# Patient Record
Sex: FEMALE | Race: BLACK OR AFRICAN AMERICAN | NOT HISPANIC OR LATINO | URBAN - METROPOLITAN AREA
[De-identification: names, ages, dates, MRNs, and addresses within clinical notes are randomized per-mention and may not be internally consistent; named-entity substitution may affect disease eponyms.]

---

## 2019-06-29 ENCOUNTER — EMERGENCY (EMERGENCY)
Age: 49
LOS: 1 days | Discharge: HOME | End: 2019-06-29
Admitting: EMERGENCY MEDICINE
Payer: COMMERCIAL

## 2019-06-29 VITALS
RESPIRATION RATE: 18 BRPM | OXYGEN SATURATION: 100 % | HEART RATE: 76 BPM | TEMPERATURE: 98 F | DIASTOLIC BLOOD PRESSURE: 66 MMHG | SYSTOLIC BLOOD PRESSURE: 113 MMHG

## 2019-06-29 VITALS
WEIGHT: 167 LBS | HEART RATE: 78 BPM | SYSTOLIC BLOOD PRESSURE: 124 MMHG | OXYGEN SATURATION: 99 % | DIASTOLIC BLOOD PRESSURE: 72 MMHG | TEMPERATURE: 99 F | RESPIRATION RATE: 16 BRPM

## 2019-06-29 PROCEDURE — 99282 EMERGENCY DEPT VISIT SF MDM: CPT
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
## 2019-06-29 NOTE — ED STATDOCS - OBJECTIVE STATEMENT
I performed a medical screening examination and determined this patient to be medically stable and will transfer to the [facility] adult ED for further care. heart and lung exam done and both did not reveal concerns for immediate intervention.    discussed case with Dr. Bradford.
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
## 2019-06-30 RX ORDER — IBUPROFEN 200 MG
600 TABLET ORAL ONCE
Refills: 0 | Status: COMPLETED | OUTPATIENT
Start: 2019-06-30 | End: 2019-06-30

## 2019-06-30 RX ADMIN — Medication 600 MILLIGRAM(S): at 00:10
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
## 2019-06-30 NOTE — ED PROVIDER NOTE - CHPI ED SYMPTOMS NEG
Denies LOC, head injury, n/v/d, extremities numbness, weakness, tingling, or  SOB Denies LOC, head injury, n/v/d, extremities numbness, weakness, tingling

## 2019-06-30 NOTE — ED PROVIDER NOTE - RESPIRATORY, MLM
Breath sounds clear and equal bilaterally. Breath sounds clear and equal bilaterally. No chest wall ecchymosis or tenderness.
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
## 2019-06-30 NOTE — ED PROVIDER NOTE - NSFOLLOWUPINSTRUCTIONS_ED_ALL_ED_FT
Follow with your PMD within 48-72 hours.  Rest, no strenuous activity.  Warm compresses to area. Recommend Ortho consult to discuss possible Physical Therapy- referral list provided.  Light walking. Take Motrin 600 mg every 8 hours for pain with food. Any worsening pain, weakness, numbness, or any other concerns return to ER